# Patient Record
Sex: FEMALE | Race: WHITE | Employment: FULL TIME
[De-identification: names, ages, dates, MRNs, and addresses within clinical notes are randomized per-mention and may not be internally consistent; named-entity substitution may affect disease eponyms.]

---

## 2020-01-11 ENCOUNTER — NURSE TRIAGE (OUTPATIENT)
Dept: OTHER | Facility: CLINIC | Age: 55
End: 2020-01-11

## 2020-01-11 NOTE — TELEPHONE ENCOUNTER
Reason for Disposition   [1] SEVERE pain AND [2] not improved 2 hours after pain medicine    Protocols used: SHOULDER PAIN-ADULT-    Pt states she fell a few days ago, had no pain following, just some ache. States now she has sharp pain in left shoulder blade that started at midnight last night. She states she also has pain on her left shoulder and down her arm. Fall was down 3-4 steps on backside. No head injury, no LOC. Pt has been using OTC medications that is not helping with sharp pain. Previous, pt could help pain by changing positions, now pain is constant. Pt rates pain 8/10. Triage indicates for pt to be seen in the ED. [facility] will go to [facility]. Pt instructed to call back for any new or worsening symptoms. Pt denies any other questions or concerns. Please do not respond to the triage nurse through this encounter. Any subsequent communication should be directly with the patient.